# Patient Record
Sex: MALE | URBAN - METROPOLITAN AREA
[De-identification: names, ages, dates, MRNs, and addresses within clinical notes are randomized per-mention and may not be internally consistent; named-entity substitution may affect disease eponyms.]

---

## 2022-03-24 ENCOUNTER — APPOINTMENT (RX ONLY)
Dept: URBAN - METROPOLITAN AREA CLINIC 335 | Facility: CLINIC | Age: 77
Setting detail: DERMATOLOGY
End: 2022-03-24

## 2022-03-24 DIAGNOSIS — L82.1 OTHER SEBORRHEIC KERATOSIS: ICD-10-CM

## 2022-03-24 DIAGNOSIS — D18.0 HEMANGIOMA: ICD-10-CM

## 2022-03-24 DIAGNOSIS — L63.8 OTHER ALOPECIA AREATA: ICD-10-CM | Status: INADEQUATELY CONTROLLED

## 2022-03-24 DIAGNOSIS — L81.4 OTHER MELANIN HYPERPIGMENTATION: ICD-10-CM

## 2022-03-24 PROBLEM — D18.01 HEMANGIOMA OF SKIN AND SUBCUTANEOUS TISSUE: Status: ACTIVE | Noted: 2022-03-24

## 2022-03-24 PROCEDURE — ? COUNSELING

## 2022-03-24 PROCEDURE — ? IN-HOUSE DISPENSING PHARMACY

## 2022-03-24 PROCEDURE — ? ADDITIONAL NOTES

## 2022-03-24 PROCEDURE — 99213 OFFICE O/P EST LOW 20 MIN: CPT

## 2022-03-24 ASSESSMENT — LOCATION SIMPLE DESCRIPTION DERM
LOCATION SIMPLE: ABDOMEN
LOCATION SIMPLE: RIGHT LOWER BACK
LOCATION SIMPLE: LEFT SHOULDER
LOCATION SIMPLE: LEFT CHEEK

## 2022-03-24 ASSESSMENT — LOCATION DETAILED DESCRIPTION DERM
LOCATION DETAILED: LEFT INFERIOR MEDIAL BUCCAL CHEEK
LOCATION DETAILED: LEFT ANTERIOR SHOULDER
LOCATION DETAILED: EPIGASTRIC SKIN
LOCATION DETAILED: RIGHT SUPERIOR MEDIAL MIDBACK

## 2022-03-24 ASSESSMENT — LOCATION ZONE DERM
LOCATION ZONE: ARM
LOCATION ZONE: TRUNK
LOCATION ZONE: FACE

## 2022-03-24 NOTE — PROCEDURE: IN-HOUSE DISPENSING PHARMACY
Product 12 Amount/Unit (Numbers Only): 1
Product 14 Unit Type: bottle(s)
Product 59 Unit Type: mg
Product 57 Amount/Unit (Numbers Only): 0
Product 24 Application Directions: Take one tablet twice daily or as directed by provider.
Name Of Product 21: #21 Minocycline
Product 23 Price/Unit (In Dollars): 10
Name Of Product 19: #19 Doxycycline
Name Of Product 15: #15 Antifungal Shampoo
Product 6 Price/Unit (In Dollars): 55
Product 24 Unit Type: capsules
Product 17 Price/Unit (In Dollars): 20
Name Of Product 4: #4 Acne Gel
Name Of Product 11: #11 Antibacterial Ointment
Product 13 Price/Unit (In Dollars): 45
Product 2 Price/Unit (In Dollars): 50
Product 6 Refills: 3
Detail Level: Zone
Product 21 Application Directions: Take one tablet twice daily with food.
Product 19 Application Directions: Take one tablet twice daily with food or as directed by provider.
Product 15 Application Directions: Apply to the scalp 2-3 times weekly. Can be applied every other shampoo as well.
Product 4 Application Directions: Apply to face at bedtime.
Send Charges To Patient Encounter: Yes
Product 11 Application Directions: Apply twice daily to wound/affected areas.
Product 9 Application Directions: Apply to the affected area three times daily.
Product 23 Application Directions: Use as directed by provider.
Name Of Product 20: #20 Loratadine
Name Of Product 18: #18 Bactrim
Name Of Product 7: #7 Anti Fungal Nail Solution
Name Of Product 14: #14 Rosacea Silicone Gel
Name Of Product 3: #3 Acne Gel Combo
Product 23 Unit Type: tablets
Product 16 Price/Unit (In Dollars): 35
Product 12 Price/Unit (In Dollars): 60
Name Of Product 10: #10 Fungal Dermatitis Cream
Name Of Product 24: Spironolactone 25mg
Product 7 Application Directions: Apply to the nails daily. (Please do not apply socks for about 5 hours, best if applied at night) Remove once weekly then start again.
Product 22 Amount/Unit (Numbers Only): 18
Product 20 Application Directions: Take one tablet daily.
Product 14 Application Directions: Apply to the face once daily (AM or PM).
Product 3 Application Directions: Apply to the face once daily AM or PM depending if Retinol or Adapalene given for bedtime.
Product 10 Application Directions: Apply to the affected area twice daily for 10 days.
Product 8 Application Directions: Apply to the affected area twice daily.
Name Of Product 17: #17 Xerosis Gel
Name Of Product 6: #6 Alopecia Topical Solution/Gel
Name Of Product 13: #13 Rosacea Cream
Product 15 Price/Unit (In Dollars): 30
Name Of Product 2: #2 Acne Moisturizing Cream
Name Of Product 9: #9 Dermatitis Cream
Product 6 Application Directions: Apply to the scalp three times weekly. (Suggested Monday, Wednesday, Friday)
Product 17 Application Directions: Apply daily to affected areas.
Product 13 Application Directions: Apply to the face thin layer daily.
Product 2 Application Directions: Apply to the face at bedtime. Start every other night hen increase usage depending on tolerance.
Render Product Pricing In Note: No
Name Of Product 22: #22 Prednisone 20mg
Product 23 Amount/Unit (Numbers Only): 5
Name Of Product 16: #16 Dermatitis Topical Solution
Product 20 Price/Unit (In Dollars): 15
Name Of Product 5: #5 Actinic Keratosis (Actinic Gel)
Name Of Product 12: #12 Melasma Emulsion
Name Of Product 1: #1 Acne Gel
Product 3 Price/Unit (In Dollars): 40
Name Of Product 8: #8 Anti Fungal Cream
Product 16 Application Directions: Apply to the affected area 1-2 times daily depending on diagnosis.
Product 5 Application Directions: For Warts/MC- apply to the area 2-3 times weekly.\\nFor (Pre)Skin Cancer- apply nightly for 2-6 weeks or as directed by physician.
Product 20 Amount/Unit (Numbers Only): 30
Product 12 Application Directions: Apply to the face three times weekly at night for two months only. Take a two month break and then continue for two months again. Can be applied with Lytera daily.
Product 1 Application Directions: Apply to the face twice daily (if another rx is being prescribed it will only be applied in the AM)
Name Of Product 23: #23 Prednisone 50mg

## 2022-03-24 NOTE — HPI: EVALUATION OF SKIN LESION(S)
Hpi Title: Evaluation of Skin Lesions
Additional History: Patient complains of hair loss at beard. Patient reports being seen by PCP and given Clotrimazole/betamethasone.